# Patient Record
Sex: FEMALE | HISPANIC OR LATINO | Employment: STUDENT | URBAN - METROPOLITAN AREA
[De-identification: names, ages, dates, MRNs, and addresses within clinical notes are randomized per-mention and may not be internally consistent; named-entity substitution may affect disease eponyms.]

---

## 2023-04-05 ENCOUNTER — HOSPITAL ENCOUNTER (EMERGENCY)
Facility: CLINIC | Age: 23
Discharge: HOME | End: 2023-04-05
Attending: EMERGENCY MEDICINE | Admitting: EMERGENCY MEDICINE
Payer: COMMERCIAL

## 2023-04-05 VITALS
SYSTOLIC BLOOD PRESSURE: 122 MMHG | OXYGEN SATURATION: 100 % | RESPIRATION RATE: 18 BRPM | DIASTOLIC BLOOD PRESSURE: 71 MMHG | TEMPERATURE: 97.1 F | HEART RATE: 68 BPM

## 2023-04-05 DIAGNOSIS — R55 VASOVAGAL NEAR SYNCOPE: ICD-10-CM

## 2023-04-05 DIAGNOSIS — K21.9 GASTROESOPHAGEAL REFLUX DISEASE WITHOUT ESOPHAGITIS: ICD-10-CM

## 2023-04-05 LAB
ATRIAL RATE - MUSE: 80 BPM
DIASTOLIC BLOOD PRESSURE - MUSE: NORMAL MMHG
INTERPRETATION ECG - MUSE: NORMAL
P AXIS - MUSE: 82 DEGREES
PR INTERVAL - MUSE: 130 MS
QRS DURATION - MUSE: 80 MS
QT - MUSE: 356 MS
QTC - MUSE: 410 MS
R AXIS - MUSE: 86 DEGREES
SYSTOLIC BLOOD PRESSURE - MUSE: NORMAL MMHG
T AXIS - MUSE: 45 DEGREES
VENTRICULAR RATE- MUSE: 80 BPM

## 2023-04-05 PROCEDURE — 99283 EMERGENCY DEPT VISIT LOW MDM: CPT

## 2023-04-05 PROCEDURE — 93005 ELECTROCARDIOGRAM TRACING: CPT

## 2023-04-05 PROCEDURE — 250N000009 HC RX 250: Performed by: EMERGENCY MEDICINE

## 2023-04-05 PROCEDURE — 250N000013 HC RX MED GY IP 250 OP 250 PS 637: Performed by: EMERGENCY MEDICINE

## 2023-04-05 RX ADMIN — LIDOCAINE HYDROCHLORIDE 30 ML: 20 SOLUTION ORAL; TOPICAL at 16:33

## 2023-04-05 ASSESSMENT — ENCOUNTER SYMPTOMS: LIGHT-HEADEDNESS: 1

## 2023-04-05 NOTE — ED PROVIDER NOTES
History     Chief Complaint:  Chest Pain and Dizziness       HPI   Sondra Johnson is a 22 year old female who presents with chest pain and lightheadedness. Patient states that she woke up with chest pain and then took a shower immediately after getting up. While showering she became lightheaded and had to lay on the ground for a period of time to keep from fainting. She has felt normal the rest of the day besides some persistent mid chest pain radiating to the back. No personal history of cardiac disease, HTN, diabetes, blood clots, or high cholesterol. Patient reports that her grandfather had a heart attack when he was 45. Sondra is on birth control. She is not a smoker. She recently went on a road trip but was not in the car for more than four hours at a time.     Independent Historian:   None - Patient Only    Review of External Notes: none     ROS:  Review of Systems   Cardiovascular: Positive for chest pain.   Neurological: Positive for light-headedness (since resolved).   All other systems reviewed and are negative.    Allergies:  No Known Allergies     Medications:    The patient is not currently taking any prescribed medications.    Past Medical History:    The patient denies any significant past medical history.    Social History:  Arrives alone via private vehicle.     Physical Exam     Patient Vitals for the past 24 hrs:   BP Temp Temp src Pulse Resp SpO2   04/05/23 1654 122/71 -- -- 68 18 100 %   04/05/23 1613 131/81 97.1  F (36.2  C) Temporal 79 18 99 %        Physical Exam  Nursing note and vitals reviewed.  Constitutional:  Alert.  Appears comfortable.   HENT:    Mucus membranes moist  Eyes:    Conjunctivae are normal.      Right eye exhibits no discharge. Left eye exhibits no discharge.   Cardiovascular:  Normal rate, regular rhythm.      Normal heart sounds.     No murmur heard.     She has some tenderness over the midsternum. I am not fully able to reproduce her pain.   Pulmonary/Chest:   Breath sounds clear and equal. No respiratory distress.     No stridor.   Neurological:   Alert and appropriate. No focal weakness.  Skin:    Skin is warm and dry. No rash noted. No diaphoresis.   Psychiatric:   Behavior is normal. Judgment and thought content normal.     Emergency Department Course   ECG  ECG taken at 1605, ECG read at 1640  Normal sinus rhythm   Normal ECG   Rate 80 bpm. UT interval 130 ms. QRS duration 80 ms. QT/QTc 356/410 ms. P-R-T axes 82 86 45.       Emergency Department Course & Assessments:       Interventions:  Medications   lidocaine (viscous) (XYLOCAINE) 2 % 15 mL, alum & mag hydroxide-simethicone (MAALOX) 15 mL GI Cocktail (30 mLs Oral $Given 4/5/23 1633)        Assessments:  1630 I obtained history and examined the patient as noted above.    Social Determinants of Health affecting care:   None    Disposition:  The patient was discharged to home.     Impression & Plan      Medical Decision Making:  Patient comes in after having a near syncopal spell after she got out of bed and jumped in the hot shower.  She did not pass out.  Her exam here is normal.  She developed some substernal central chest pain today that is a burning sensation.  No cardiac history and her heart score is 0.  EKG is normal.  She was given a GI cocktail which completely relieved her symptoms.  I am comfortable discharging her home.  She had a vasovagal episode by jumping right in the hot shower.  I have encouraged her in the future to get up move around drink a glass of water or have breakfast before she showers in the morning.  If she has further problems she will follow-up with her doctor in the clinic.  She said she has passed out before so she is susceptible to this.  Liquid antiacid is recommended for symptomatic treatment    Try liquid antacid as needed, do not jump in the shower first thing in the morning, drink a glass of water and get something to eat.  Follow-up in the clinic as needed.    Diagnosis:     ICD-10-CM    1. Gastroesophageal reflux disease without esophagitis  K21.9       2. Vasovagal near syncope  R55              Scribe Disclosure:  I, Lilliana Chaudhary, am serving as a scribe at 4:33 PM on 4/5/2023 to document services personally performed by Hannah Mitchell MD based on my observations and the provider's statements to me.   4/5/2023   Hannah Mitchell MD Powell, Tracy Alan, MD  04/05/23 1721

## 2023-04-05 NOTE — ED TRIAGE NOTES
Pt reports having mid sternal chest pain that began after waking up this am. Pt report pain is sharp and achy and she has nausea during the first episode. Pt took shower and became dizzy and then laid on the floor and the symptoms improved. Continues to have chest pain. Reports increased stress and anxiety.      Triage Assessment     Row Name 04/05/23 8366       Triage Assessment (Adult)    Airway WDL WDL       Respiratory WDL    Respiratory WDL WDL       Skin Circulation/Temperature WDL    Skin Circulation/Temperature WDL WDL       Cardiac WDL    Cardiac WDL X       Peripheral/Neurovascular WDL    Peripheral Neurovascular WDL WDL       Cognitive/Neuro/Behavioral WDL    Cognitive/Neuro/Behavioral WDL WDL

## 2023-04-05 NOTE — DISCHARGE INSTRUCTIONS
Try liquid antacid as needed, do not jump in the shower first thing in the morning, drink a glass of water and get something to eat.  Follow-up in the clinic as needed.